# Patient Record
(demographics unavailable — no encounter records)

---

## 2025-05-29 NOTE — IMAGING
[de-identified] : RIGHT HAND skin intact. no swelling. no TTP. wrist ROM: good extension, flexion. good pronation, supination. good EPL, FPL. fingers good extension, flex to full fist. good finger abduction and adduction. SILT to median, ulnar, radial distribution. palpable radial pulse, brisk cap refill all digits. APB 5/5. no triggering. negative Tinel's at carpal tunnel.   + min pain with ulnar deviation. no pain with wrist flexion, extension. no pain with pronation, supination. no pain with radial and ulnar deviation. DRUJ shear => + mild pain @supination. no instability.  @[facility] 4/30/25 XRAYS OF RIGHT WRIST (3 views - PA, OBLIQUE, AND LATERAL VIEWS): no acute displaced fracture or dislocation. djd of MPJ of index, middle, ring fingers. djd of thumb CMCJ. small ossification in ulnocarpal joint. djd and calcification of distal radioulnar joint.

## 2025-05-29 NOTE — ASSESSMENT
[FreeTextEntry1] : The condition was explained to the patient. - recommend wrist widget vs wrist brace PRN painful activity. - prescribed OT for R wrist. - pain guided activity modification. - recommend OTC pain medications such as Tylenol and NSAIDs as needed. reviewed contra-indicated medical conditions (eg liver disease, kidney disease, or GI ulcer/bleeding) or medications (eg blood thinners). Discussed possible GI and blood pressure side effects. 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 Letter provided, will re-evaluate in 6 weeks.   F/u 6 weeks.

## 2025-05-29 NOTE — HISTORY OF PRESENT ILLNESS
[de-identified] : 5/29/25: 53yo male (RHD. [employer] - [employer]) presents for RIGHT wrist pain after an injury 10 years ago. He was performing a takedown maneuver during training for his job ([employer]). c/o dorsal ulnar wrist pain with lifting. c/o volar wrist pain when using the mouse. Reports no prior evaluation/treatment until he saw his PCP on 4/30/25 => XR.  Hx: HTN. HLD. Sx: R knee PMM (Dr Cristobal Escobedo 2025). L eardrum repair. [FreeTextEntry5] : NAZARIO CRAWFORDKIMMY terrazas [RHD] 54 year old male is here today for evaluation of RIGHT wrist pain x 10 years after falling during training for [employer]. no tx until 04/30/25 when he saw his PCP +xrays. c/o intermittent pain and tingling, worse when on computer.

## 2025-06-04 NOTE — ADDENDUM
[FreeTextEntry1] :  All medical record entries made by Panchito Burciaga were at my, Dr. Cristobal Escobedo, direction and personally dictated by me on 06/04/2025. I have reviewed the chart and agree that the record accurately reflects my personal performance of the history, physical exam, assessment and plan. I have also personally directed, reviewed, and agreed with the chart.

## 2025-06-04 NOTE — PHYSICAL EXAM
[Normal RLE] : Right Lower Extremity: No scars, rashes, lesions, ulcers, skin intact [Normal Touch] : sensation intact for touch [Normal] : Alert and in no acute distress [de-identified] : Right Lower Extremity o Knee :  Inspection/Palpation : no tenderness to palpation over medial joint line, tenderness over the anterior medial and anterior lateral portal sites, , no effusion, no deformity  Range of Motion : 0 - 130 degrees, no crepitus  Stability : no valgus or varus instability present on provocative testing, Lachmans Test (-)  Strength : flexion and extension 5/5, adductor 3/5, glute med 3+/5, quad 3/5 Additional tests: o Muscle Bulk : normal muscle bulk present o Skin : no erythema, no ecchymosis o Sensation : sensation to pin intact o Vascular Exam : no edema, no cyanosis, dorsalis pedis artery pulse 2+, posterior tibial artery pulse 2+

## 2025-06-04 NOTE — DISCUSSION/SUMMARY
[de-identified] : The underlying pathophysiology was reviewed in great detail with the patient as well as the various treatment options, including ice, analgesics, NSAIDs, Physical therapy, steroid injections. Activity modifications and restrictions were discussed.  I advised avoiding deep bending, squatting and high intensity activity.  I have recommended utilizing a knee sleeve to provide added support and stability.  I recommended a course of physical therapy for the right knee to improve strength and ROM. A script was provided today.  FU 6 weeks or 1 month.    All questions were answered, all alternatives discussed and the patient is in complete agreement with that plan. Follow-up appointment as instructed. Any issues and the patient will call or come in sooner.

## 2025-06-04 NOTE — PHYSICAL EXAM
[Normal RLE] : Right Lower Extremity: No scars, rashes, lesions, ulcers, skin intact [Normal Touch] : sensation intact for touch [Normal] : Alert and in no acute distress [de-identified] : Right Lower Extremity o Knee :  Inspection/Palpation : no tenderness to palpation over medial joint line, tenderness over the anterior medial and anterior lateral portal sites, , no effusion, no deformity  Range of Motion : 0 - 130 degrees, no crepitus  Stability : no valgus or varus instability present on provocative testing, Lachmans Test (-)  Strength : flexion and extension 5/5, adductor 3/5, glute med 3+/5, quad 3/5 Additional tests: o Muscle Bulk : normal muscle bulk present o Skin : no erythema, no ecchymosis o Sensation : sensation to pin intact o Vascular Exam : no edema, no cyanosis, dorsalis pedis artery pulse 2+, posterior tibial artery pulse 2+

## 2025-06-04 NOTE — DISCUSSION/SUMMARY
[de-identified] : The underlying pathophysiology was reviewed in great detail with the patient as well as the various treatment options, including ice, analgesics, NSAIDs, Physical therapy, steroid injections. Activity modifications and restrictions were discussed.  I advised avoiding deep bending, squatting and high intensity activity.  I have recommended utilizing a knee sleeve to provide added support and stability.  I recommended a course of physical therapy for the right knee to improve strength and ROM. A script was provided today.  FU 6 weeks or 1 month.    All questions were answered, all alternatives discussed and the patient is in complete agreement with that plan. Follow-up appointment as instructed. Any issues and the patient will call or come in sooner.

## 2025-07-14 NOTE — HISTORY OF PRESENT ILLNESS
[de-identified] : 7/14/25: f/u RIGHT DRUJ arthritis. wrist widget PRN. OT 2x/wk @[facility]. continues to have pain, but strength is better. Not currently taking any medication for this. He is currently working light duty.  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 [employer] - [employer]) presents for RIGHT wrist pain after an injury 10 years ago. He was performing a takedown maneuver during training for his job ([employer]). c/o dorsal ulnar wrist pain with lifting. c/o volar wrist pain when using the mouse. Reports no prior evaluation/treatment until he saw his PCP on 4/30/25 => XR.  Hx: HTN. HLD. Sx: R knee PMM (Dr Cristobal Escobedo 2025). L eardrum repair.  [FreeTextEntry5] : NAZARIO terrazas [RHD] 54 year old male is here today for fu of RIGHT wrist pain. states improvement w/ strength but continued pain. OT 2x a week.

## 2025-07-14 NOTE — ASSESSMENT
[FreeTextEntry1] : The condition was explained to the patient.  Patient would like to proceed with CSI. Discussed risk of cartilage damage with CSI, particularly with repeated injections. - Discussed risks, benefits, and alternatives as well as contents of injection. Risks include, but are not limited allergic reaction, flare reaction, injection site pain, bruising, numbness, increased blood sugar, elevated blood pressure, facial flushing, skin discoloration, fat atrophy, tendon rupture, and infection. Risk of immune suppression and increased susceptibility to infection with steroid use. Patient expressed understanding and would like to proceed with injection. - The skin over the RIGHT dorsal ulnocarpal joint was cleansed with alcohol/betadine and anesthetized with ethyl chloride and 1cc of 1% lidocaine. The joint was injected with 6mg of celestone. Site was dressed with gauze and an ACE wrap. Patient tolerated the procedure well. - discussed that it may take up to 1 week for symptoms to improve after CSI.  - wrist widget vs wrist brace PRN painful activity. - complete course of OT for R wrist. - pain guided activity modification. 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 Letter updated - continue light duty x 1 week, return to full duty on 7/21/25.  F/u 2 weeks.

## 2025-07-14 NOTE — IMAGING
[de-identified] : RIGHT HAND skin intact. no swelling. TTP to dorsal DRUJ and fovea. wrist ROM: good extension, flexion. good pronation, supination. good EPL, FPL. fingers good extension, flex to full fist. good finger abduction and adduction. SILT to median, ulnar, radial distribution. palpable radial pulse, brisk cap refill all digits. no triggering.   + min pain with ulnar deviation. no pain with wrist flexion, extension. no pain with pronation, supination.  DRUJ shear => + mild pain @supination. no instability.  @[facility] 4/30/25 XRAYS OF RIGHT WRIST (3 views - PA, OBLIQUE, AND LATERAL VIEWS): no acute displaced fracture or dislocation. djd of MPJ of index, middle, ring fingers. djd of thumb CMCJ. small ossification in ulnocarpal joint. djd and calcification of distal radioulnar joint.

## 2025-07-28 NOTE — ASSESSMENT
[FreeTextEntry1] : - wrist widget vs wrist brace PRN painful activity. - continue HEP. - advance activity as pain allows.  F/u PRN.

## 2025-07-28 NOTE — HISTORY OF PRESENT ILLNESS
[de-identified] : 7/28/25: f/u RIGHT DRUJ arthritis, [employer] injury. s/p CSI of ulnocarpal joint on 7/14/25. pain 60% better. + wrist widget PRN. Not currently taking any medication for this. OT 1x/wk. He has returned to work full duty.  7/14/25: f/u RIGHT DRUJ arthritis. wrist widget PRN. OT 2x/wk @[facility]. continues to have pain, but strength is better. Not currently taking any medication for this. He is currently working light duty.  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 He was performing a takedown maneuver during training for his job ([employer]). c/o dorsal ulnar wrist pain with lifting. c/o volar wrist pain when using the mouse. Reports no prior evaluation/treatment until he saw his PCP on 4/30/25 => XR.  Hx: HTN. HLD. Sx: R knee PMM (Dr Cristobal Escobedo 2025). L eardrum repair.  [FreeTextEntry5] : NAZARIO is here today to follow up on his RIGHT wrist. pt states since last visit, pain decreased after CSI. attending 1x/week. using widget PRN.

## 2025-07-28 NOTE — IMAGING
[de-identified] : RIGHT HAND skin intact. no swelling. mild TTP to dorsal DRUJ. min TTP to fovea. wrist ROM: good extension, flexion. good pronation, supination. good EPL, FPL. fingers good extension, flex to full fist. good finger abduction and adduction. SILT to median, ulnar, radial distribution. palpable radial pulse, brisk cap refill all digits. no triggering.   no pain with radial / ulnar deviation. no pain with wrist flexion, extension. no pain with pronation, supination.  DRUJ shear => + min pain @supination. no instability.  @[facility] 4/30/25 XRAYS OF RIGHT WRIST (3 views - PA, OBLIQUE, AND LATERAL VIEWS): no acute displaced fracture or dislocation. djd of MPJ of index, middle, ring fingers. djd of thumb CMCJ. small ossification in ulnocarpal joint. djd and calcification of distal radioulnar joint.